# Patient Record
Sex: FEMALE | Race: BLACK OR AFRICAN AMERICAN | Employment: FULL TIME | URBAN - METROPOLITAN AREA
[De-identification: names, ages, dates, MRNs, and addresses within clinical notes are randomized per-mention and may not be internally consistent; named-entity substitution may affect disease eponyms.]

---

## 2017-02-23 ENCOUNTER — OFFICE VISIT (OUTPATIENT)
Dept: INTERNAL MEDICINE CLINIC | Age: 57
End: 2017-02-23

## 2017-02-23 VITALS
BODY MASS INDEX: 34.27 KG/M2 | HEIGHT: 63 IN | SYSTOLIC BLOOD PRESSURE: 127 MMHG | OXYGEN SATURATION: 99 % | WEIGHT: 193.4 LBS | TEMPERATURE: 96.6 F | RESPIRATION RATE: 17 BRPM | DIASTOLIC BLOOD PRESSURE: 50 MMHG | HEART RATE: 72 BPM

## 2017-02-23 DIAGNOSIS — E78.5 MILD HYPERLIPIDEMIA: ICD-10-CM

## 2017-02-23 DIAGNOSIS — R73.09 ELEVATED GLYCOSYLATED HEMOGLOBIN: Primary | ICD-10-CM

## 2017-02-23 LAB — HBA1C MFR BLD HPLC: 5.5 %

## 2017-02-23 NOTE — PATIENT INSTRUCTIONS
Prediabetes: Care Instructions  Your Care Instructions  Prediabetes is a warning sign that you are at risk for getting type 2 diabetes. It means that your blood sugar is higher than it should be. The food you eat turns into sugar, which your body uses for energy. Normally, an organ called the pancreas makes insulin, which allows the sugar in your blood to get into your body's cells. But when your body can't use insulin the right way, the sugar doesn't move into cells. It stays in your blood instead. This is called insulin resistance. The buildup of sugar in the blood causes prediabetes. The good news is that lifestyle changes may help you get your blood sugar back to normal and help you avoid or delay diabetes. Follow-up care is a key part of your treatment and safety. Be sure to make and go to all appointments, and call your doctor if you are having problems. It's also a good idea to know your test results and keep a list of the medicines you take. How can you care for yourself at home? · Watch your weight. A healthy weight helps your body use insulin properly. · Limit the amount of calories, sweets, and unhealthy fat you eat. Ask your doctor if you should see a dietitian. A registered dietitian can help you create meal plans that fit your lifestyle. · Get at least 30 minutes of exercise on most days of the week. Exercise helps control your blood sugar. It also helps you maintain a healthy weight. Walking is a good choice. You also may want to do other activities, such as running, swimming, cycling, or playing tennis or team sports. · Do not smoke. Smoking can make prediabetes worse. If you need help quitting, talk to your doctor about stop-smoking programs and medicines. These can increase your chances of quitting for good. · If your doctor prescribed medicines, take them exactly as prescribed. Call your doctor if you think you are having a problem with your medicine.  You will get more details on the specific medicines your doctor prescribes. When should you call for help? Watch closely for changes in your health, and be sure to contact your doctor if:  · You have any symptoms of diabetes. These may include:  ¨ Being thirsty more often. ¨ Urinating more. ¨ Being hungrier. ¨ Losing weight. ¨ Being very tired. ¨ Having blurry vision. · You have a wound that will not heal.  · You have an infection that will not go away. · You have problems with your blood pressure. · You want more information about diabetes and how you can keep from getting it. Where can you learn more? Go to http://micha-cydney.info/. Enter I222 in the search box to learn more about \"Prediabetes: Care Instructions. \"  Current as of: May 23, 2016  Content Version: 11.1  © 6933-7790 [employer]. Care instructions adapted under license by [employer] (which disclaims liability or warranty for this information). If you have questions about a medical condition or this instruction, always ask your healthcare professional. [address] any warranty or liability for your use of this information.

## 2017-02-24 NOTE — PROGRESS NOTES
Subjective:     Chief Complaint   Patient presents with    Other     6 mo f/u        She  is a 60y.o. year old female  With no significant past medical history is here for a 6 month follow up. Her last lab showed elevated A1c and mild hyperlipidemia otherwise normal.    She has been doing exercise at least 3 times/week. Feels energetic and better. No chest pain, soa, UTI symptoms. Pertinent items are noted in HPI. Objective:     Vitals:    02/23/17 1357   BP: 127/50   Pulse: 72   Resp: 17   Temp: 96.6 °F (35.9 °C)   TempSrc: Oral   SpO2: 99%   Weight: 193 lb 6.4 oz (87.7 kg)   Height: 5' 3\" (1.6 m)       Physical Examination: General appearance - alert, well appearing, and in no distress, oriented to person, place, and time and overweight  Mental status - alert, oriented to person, place, and time, normal mood, behavior, speech, dress, motor activity, and thought processes  Chest - clear to auscultation, no wheezes, rales or rhonchi, symmetric air entry  Heart - normal rate, regular rhythm, normal S1, S2, no murmurs, rubs, clicks or gallops.     Allergies   Allergen Reactions    Lodine [Etodolac] Swelling     Face and neck swelling      Social History     Social History    Marital status:      Spouse name: N/A    Number of children: 2    Years of education: college     Occupational History     [address] [facility]     Social History Main Topics    Smoking status: Former Smoker     Packs/day: 0.50     Quit date: 3/3/2001    Smokeless tobacco: Never Used    Alcohol use 1.2 oz/week     1 Glasses of wine, 1 Standard drinks or equivalent per week      Comment: 2-3 per month, occ    Drug use: No    Sexual activity: Yes     Partners: Male     Birth control/ protection: None     Other Topics Concern     Service No    Blood Transfusions No    Caffeine Concern No    Occupational Exposure No    Hobby Hazards No    Sleep Concern Yes     trouble staying asleep, can fall asleep easily  Stress Concern No    Weight Concern No    Special Diet No    Back Care No    Exercise No    Bike Helmet No    Seat Belt Yes    Self-Exams Yes     Social History Narrative      Family History   Problem Relation Age of Onset    Cancer Mother      breast cancer    Asthma Mother     Breast Cancer Mother     Cancer Brother 62     colon cancer    Heart Disease Father     Heart Attack Father     Hypertension Father     Psychiatric Disorder Sister     Cancer Sister 27     breast cancer    Breast Cancer Sister     Diabetes Other      cousin    Thyroid Disease Other      aunt    Rashes/Skin Problems Other      daughter      Past Surgical History:   Procedure Laterality Date    HX COLONOSCOPY  2015    HX WRIST FRACTURE [place]        Past Medical History:   Diagnosis Date    Family history of fibrocystic disease of breast     Fibrocystic breast changes 2009           Assessment/ Plan:   [facility] was seen today for other. Diagnoses and all orders for this visit:    Elevated glycosylated hemoglobin  -     AMB POC HEMOGLOBIN A1C is 5.5. Improved. -   Continue work on diet and exercise. Mild hyperlipidemia       ----     Continue work on diet and exercise. Medication risks/benefits/costs/interactions/alternatives discussed with patient. Advised patient to call back or return to office if symptoms worsen/change/persist. If patient cannot reach us or should anything more severe/urgent arise he/she should proceed directly to the nearest emergency department. Discussed expected course/resolution/complications of diagnosis in detail with patient. Patient given a written after visit summary which includes her diagnoses, current medications and vitals. Patient expressed understanding with the diagnosis and plan. Follow-up Disposition:  Return in about 6 months (around 8/23/2017) for complete physical  and fasting blood work. Zakiya Agarwal

## 2018-01-16 ENCOUNTER — OFFICE VISIT (OUTPATIENT)
Dept: INTERNAL MEDICINE CLINIC | Age: 58
End: 2018-01-16

## 2018-01-16 VITALS
OXYGEN SATURATION: 99 % | HEIGHT: 63 IN | DIASTOLIC BLOOD PRESSURE: 54 MMHG | RESPIRATION RATE: 17 BRPM | BODY MASS INDEX: 31.71 KG/M2 | HEART RATE: 67 BPM | TEMPERATURE: 96.3 F | WEIGHT: 179 LBS | SYSTOLIC BLOOD PRESSURE: 104 MMHG

## 2018-01-16 DIAGNOSIS — R79.89 LOW VITAMIN D LEVEL: ICD-10-CM

## 2018-01-16 DIAGNOSIS — Z00.00 WELL ADULT ON ROUTINE HEALTH CHECK: Primary | ICD-10-CM

## 2018-01-16 DIAGNOSIS — Z78.0 POST-MENOPAUSAL: ICD-10-CM

## 2018-01-16 DIAGNOSIS — Z11.59 ENCOUNTER FOR HEPATITIS C SCREENING TEST FOR LOW RISK PATIENT: ICD-10-CM

## 2018-01-16 RX ORDER — BISMUTH SUBSALICYLATE 262 MG
1 TABLET,CHEWABLE ORAL DAILY
COMMUNITY

## 2018-01-16 NOTE — PROGRESS NOTES
Subjective:   59 y.o. female for Well Woman Check. Her gyne care is done elsewhere by her Ob-Gyne physician. Up to date. Doing well. Has been active. Lost 14 pounds in one year. H/O low vit d in the past. She is not taking any supplement currently. Patient Active Problem List   Diagnosis Code    Mastodynia N64.4    Family history of breast cancer in mother Z80.2    Family history of breast cancer in sister Z80.2   [facility] Elevated glycosylated hemoglobin R73.09    Mild hyperlipidemia E78.5     Current Outpatient Prescriptions   Medication Sig Dispense Refill    multivitamin (ONE A DAY) tablet Take 1 Tab by mouth daily.        Allergies   Allergen Reactions    Lodine [Etodolac] Swelling     Face and neck swelling     Past Medical History:   Diagnosis Date    Family history of fibrocystic disease of breast     Fibrocystic breast changes 2009     Past Surgical History:   Procedure Laterality Date    HX COLONOSCOPY  2015    HX WRIST FRACTURE [place]       Family History   Problem Relation Age of Onset    Cancer Mother      breast cancer    Asthma Mother     Breast Cancer Mother     Cancer Brother 62     colon cancer    Heart Disease Father     Heart Attack Father     Hypertension Father     Psychiatric Disorder Sister     Cancer Sister 27     breast cancer    Breast Cancer Sister     Diabetes Other      cousin    Thyroid Disease Other      aunt    Rashes/Skin Problems Other      daughter     Social History   Substance Use Topics    Smoking status: Former Smoker     Packs/day: 0.50     Quit date: 3/3/2001    Smokeless tobacco: Never Used    Alcohol use 1.2 oz/week     1 Glasses of wine, 1 Standard drinks or equivalent per week      Comment: 2-3 per month, occ        Lab Results  Component Value Date/Time   Cholesterol, total 206 08/30/2016 09:14 AM   HDL Cholesterol 53 08/30/2016 09:14 AM   LDL, calculated 127 08/30/2016 09:14 AM   Triglyceride 130 08/30/2016 09:14 AM     Lab Results  Component Value Date/Time   ALT (SGPT) 37 08/30/2016 09:14 AM   AST (SGOT) 39 08/30/2016 09:14 AM   Alk. phosphatase 99 08/30/2016 09:14 AM   Bilirubin, total 0.3 08/30/2016 09:14 AM   Albumin 4.3 08/30/2016 09:14 AM   Protein, total 7.1 08/30/2016 09:14 AM   PLATELET 591 41/74/0253 09:14 AM       Lab Results   Component Value Date/Time    Hemoglobin A1c 5.8 08/30/2016 09:14 AM    Hemoglobin A1c (POC) 5.5 02/23/2017 02:20 PM         ROS: Feeling generally well. No TIA's or unusual headaches, no dysphagia. No prolonged cough. No dyspnea or chest pain on exertion. No abdominal pain, change in bowel habits, black or bloody stools. No urinary tract symptoms. No new or unusual musculoskeletal symptoms. Specific concerns today: none. .    Objective: The patient appears well, alert, oriented x 3, in no distress. Visit Vitals    /54 (BP 1 Location: Left arm, BP Patient Position: Sitting)    Pulse 67    Temp 96.3 °F (35.7 °C) (Temporal)    Resp 17    Ht 5' 3\" (1.6 m)    Wt 179 lb (81.2 kg)    SpO2 99%    BMI 31.71 kg/m2     ENT normal.  Neck supple. No adenopathy or thyromegaly. KARINA. Lungs are clear, good air entry, no wheezes, rhonchi or rales. S1 and S2 normal, no murmurs, regular rate and rhythm. Abdomen soft without tenderness, guarding, mass or organomegaly. Extremities show no edema, normal peripheral pulses. Neurological is normal, no focal findings. Skin: normal.   Breast and Pelvic exams are deferred. Assessment/Plan:   Well Woman  lose weight, increase physical activity, follow low fat diet, continue present plan, routine labs ordered, call if any problems    ICD-10-CM ICD-9-CM    1. Well adult on routine health check Z00.00 V70.0 CARLOS MAMMO BI SCREENING INCL CAD      CBC WITH AUTOMATED DIFF      LIPID PANEL      METABOLIC PANEL, COMPREHENSIVE      HEMOGLOBIN A1C WITH EAG      TSH AND FREE T4   2.  Low vitamin D level E55.9 268.9 VITAMIN D, 25 HYDROXY   3. Post-menopausal Z78.0 V49.81 DEXA BONE DENSITY STUDY AXIAL   4. Encounter for hepatitis C screening test for low risk patient Z11.59 V73.89 HEPATITIS C AB     Diagnoses and all orders for this visit:    1. Well adult on routine health check  -     CARLOS MAMMO BI SCREENING INCL CAD; Future  -     CBC WITH AUTOMATED DIFF  -     LIPID PANEL  -     METABOLIC PANEL, COMPREHENSIVE  -     HEMOGLOBIN A1C WITH EAG  -     TSH AND FREE T4    2. Low vitamin D level  -     VITAMIN D, 25 HYDROXY    3. Post-menopausal  -     DEXA BONE DENSITY STUDY AXIAL; Future  -     Advised to start taking calcium and vit D supplement. Verified dose. 4. Encounter for hepatitis C screening test for low risk patient  -     HEPATITIS C AB      Follow-up Disposition:  Return if symptoms worsen or fail to improve.   reviewed diet, exercise and weight control

## 2018-01-16 NOTE — PATIENT INSTRUCTIONS
Well Visit, Women 48 to 72: Care Instructions  Your Care Instructions    Physical exams can help you stay healthy. Your doctor has checked your overall health and may have suggested ways to take good care of yourself. He or she also may have recommended tests. At home, you can help prevent illness with healthy eating, regular exercise, and other steps. Follow-up care is a key part of your treatment and safety. Be sure to make and go to all appointments, and call your doctor if you are having problems. It's also a good idea to know your test results and keep a list of the medicines you take. How can you care for yourself at home? · Reach and stay at a healthy weight. This will lower your risk for many problems, such as obesity, diabetes, heart disease, and high blood pressure. · Get at least 30 minutes of exercise on most days of the week. Walking is a good choice. You also may want to do other activities, such as running, swimming, cycling, or playing tennis or team sports. · Do not smoke. Smoking can make health problems worse. If you need help quitting, talk to your doctor about stop-smoking programs and medicines. These can increase your chances of quitting for good. · Protect your skin from too much sun. When you're outdoors from 10 a.m. to 4 p.m., stay in the shade or cover up with clothing and a hat with a wide brim. Wear sunglasses that block UV rays. Even when it's cloudy, put broad-spectrum sunscreen (SPF 30 or higher) on any exposed skin. · See a dentist one or two times a year for checkups and to have your teeth cleaned. · Wear a seat belt in the car. · Limit alcohol to 1 drink a day. Too much alcohol can cause health problems. Follow your doctor's advice about when to have certain tests. These tests can spot problems early. · Cholesterol.  Your doctor will tell you how often to have this done based on your age, family history, or other things that can increase your risk for heart attack and stroke. · Blood pressure. Have your blood pressure checked during a routine doctor visit. Your doctor will tell you how often to check your blood pressure based on your age, your blood pressure results, and other factors. · Mammogram. Ask your doctor how often you should have a mammogram, which is an X-ray of your breasts. A mammogram can spot breast cancer before it can be felt and when it is easiest to treat. · Pap test and pelvic exam. Ask your doctor how often you should have a Pap test. You may not need to have a Pap test as often as you used to. · Vision. Have your eyes checked every year or two or as often as your doctor suggests. Some experts recommend that you have yearly exams for glaucoma and other age-related eye problems starting at age 48. · Hearing. Tell your doctor if you notice any change in your hearing. You can have tests to find out how well you hear. · Diabetes. Ask your doctor whether you should have tests for diabetes. · Colon cancer. You should begin tests for colon cancer at age 48. You may have one of several tests. Your doctor will tell you how often to have tests based on your age and risk. Risks include whether you already had a precancerous polyp removed from your colon or whether your parents, sisters and brothers, or children have had colon cancer. · Thyroid disease. Talk to your doctor about whether to have your thyroid checked as part of a regular physical exam. Women have an increased chance of a thyroid problem. · Osteoporosis. You should begin tests for bone density at age 72. If you are younger than 72, ask your doctor whether you have factors that may increase your risk for this disease. You may want to have this test before age 72. · Heart attack and stroke risk. At least every 4 to 6 years, you should have your risk for heart attack and stroke assessed.  Your doctor uses factors such as your age, blood pressure, cholesterol, and whether you smoke or have diabetes to show what your risk for a heart attack or stroke is over the next 10 years. When should you call for help? Watch closely for changes in your health, and be sure to contact your doctor if you have any problems or symptoms that concern you. Where can you learn more? Go to http://micha-cydney.info/. Enter U245 in the search box to learn more about \"Well Visit, Women 50 to 72: Care Instructions. \"  Current as of: May 12, 2017  Content Version: 11.4  © 7927-8658 [employer]. Care instructions adapted under license by [employer] (which disclaims liability or warranty for this information). If you have questions about a medical condition or this instruction, always ask your healthcare professional. [address] any warranty or liability for your use of this information.

## 2018-01-16 NOTE — MR AVS SNAPSHOT
[facility] [address] 
[phone] Patient: Alfredo Vo MRN: [id number] Visit Information Date & Time Provider Department Dept. Phone Encounter #  
 1/16/2018  7:45 AM Jared Robledo MD [facility] [phone] [id number] Follow-up Instructions Return if symptoms worsen or fail to improve. Upcoming Health Maintenance Date Due Hepatitis C Screening 1960 DTaP/Tdap/Td series (1 - Tdap) 6/29/1979 BREAST CANCER SCRN MAMMOGRAM 12/9/2017 PAP AKA CERVICAL CYTOLOGY 10/15/2018 COLONOSCOPY 7/17/2020 Allergies as of 1/16/2018  Review Complete On: 1/16/2018 By: Brian Prieto MD  
  
 Severity Noted Reaction Type Reactions Lodine [Etodolac] High 01/18/2016    Swelling Face and neck swelling Current Immunizations  Never Reviewed Name Date Influenza Vaccine (Quad) PF 10/17/2016 Not reviewed this visit You Were Diagnosed With   
  
 Codes Comments Well adult on routine health check    -  Primary ICD-10-CM: Z00.00 ICD-9-CM: V70.0 Low vitamin D level     ICD-10-CM: E55.9 ICD-9-CM: 268.9 Post-menopausal     ICD-10-CM: Z78.0 ICD-9-CM: V49.81 Encounter for hepatitis C screening test for low risk patient     ICD-10-CM: Z11.59 
ICD-9-CM: V73.89 Vitals BP Pulse Temp Resp Height(growth percentile) 104/54 (BP 1 Location: Left arm, BP Patient Position: Sitting) 67 96.3 °F (35.7 °C) (Temporal) 17 5' 3\" (1.6 m) Weight(growth percentile) SpO2 BMI OB Status Smoking Status 179 lb (81.2 kg) 99% 31.71 kg/m2 Postmenopausal Former Smoker Vitals History BMI and BSA Data Body Mass Index Body Surface Area 31.71 kg/m 2 1.9 m 2 Preferred Pharmacy Pharmacy Name Phone [id number] [place] [facility] [address] AT [address] [place] [address] [phone] Your Updated Medication List  
  
   
 This list is accurate as of: 1/16/18  8:15 AM.  Always use your most recent med list.  
  
  
  
  
 multivitamin tablet Commonly known as:  ONE A DAY Take 1 Tab by mouth daily. We Performed the Following CBC WITH AUTOMATED DIFF [01159 CPT(R)] HEMOGLOBIN A1C WITH EAG [58018 CPT(R)] HEPATITIS C AB [06353 CPT(R)] LIPID PANEL [89629 CPT(R)] METABOLIC PANEL, COMPREHENSIVE [41667 CPT(R)] TSH AND FREE T4 [67089 CPT(R)] VITAMIN D, 25 HYDROXY [id number] CPT(R)] Follow-up Instructions Return if symptoms worsen or fail to improve. To-Do List   
 01/16/2018 Imaging:  DEXA BONE DENSITY STUDY AXIAL   
  
 01/16/2018 Imaging:  CARLOS MAMMO BI SCREENING INCL CAD Patient Instructions Well Visit, Women 48 to 72: Care Instructions Your Care Instructions Physical exams can help you stay healthy. Your doctor has checked your overall health and may have suggested ways to take good care of yourself. He or she also may have recommended tests. At home, you can help prevent illness with healthy eating, regular exercise, and other steps. Follow-up care is a key part of your treatment and safety. Be sure to make and go to all appointments, and call your doctor if you are having problems. It's also a good idea to know your test results and keep a list of the medicines you take. How can you care for yourself at home? · Reach and stay at a healthy weight. This will lower your risk for many problems, such as obesity, diabetes, heart disease, and high blood pressure. · Get at least 30 minutes of exercise on most days of the week. Walking is a good choice. You also may want to do other activities, such as running, swimming, cycling, or playing tennis or team sports. · Do not smoke. Smoking can make health problems worse. If you need help quitting, talk to your doctor about stop-smoking programs and medicines. These can increase your chances of quitting for good. · Protect your skin from too much sun. When you're outdoors from 10 a.m. to 4 p.m., stay in the shade or cover up with clothing and a hat with a wide brim. Wear sunglasses that block UV rays. Even when it's cloudy, put broad-spectrum sunscreen (SPF 30 or higher) on any exposed skin. · See a dentist one or two times a year for checkups and to have your teeth cleaned. · Wear a seat belt in the car. · Limit alcohol to 1 drink a day. Too much alcohol can cause health problems. Follow your doctor's advice about when to have certain tests. These tests can spot problems early. · Cholesterol. Your doctor will tell you how often to have this done based on your age, family history, or other things that can increase your risk for heart attack and stroke. · Blood pressure. Have your blood pressure checked during a routine doctor visit. Your doctor will tell you how often to check your blood pressure based on your age, your blood pressure results, and other factors. · Mammogram. Ask your doctor how often you should have a mammogram, which is an X-ray of your breasts. A mammogram can spot breast cancer before it can be felt and when it is easiest to treat. · Pap test and pelvic exam. Ask your doctor how often you should have a Pap test. You may not need to have a Pap test as often as you used to. · Vision. Have your eyes checked every year or two or as often as your doctor suggests. Some experts recommend that you have yearly exams for glaucoma and other age-related eye problems starting at age 48. · Hearing. Tell your doctor if you notice any change in your hearing. You can have tests to find out how well you hear. · Diabetes. Ask your doctor whether you should have tests for diabetes. · Colon cancer. You should begin tests for colon cancer at age 48. You may have one of several tests.  Your doctor will tell you how often to have tests based on your age and risk. Risks include whether you already had a precancerous polyp removed from your colon or whether your parents, sisters and brothers, or children have had colon cancer. · Thyroid disease. Talk to your doctor about whether to have your thyroid checked as part of a regular physical exam. Women have an increased chance of a thyroid problem. · Osteoporosis. You should begin tests for bone density at age 72. If you are younger than 72, ask your doctor whether you have factors that may increase your risk for this disease. You may want to have this test before age 72. · Heart attack and stroke risk. At least every 4 to 6 years, you should have your risk for heart attack and stroke assessed. Your doctor uses factors such as your age, blood pressure, cholesterol, and whether you smoke or have diabetes to show what your risk for a heart attack or stroke is over the next 10 years. When should you call for help? Watch closely for changes in your health, and be sure to contact your doctor if you have any problems or symptoms that concern you. Where can you learn more? Go to http://micha-cydney.info/. Enter W516 in the search box to learn more about \"Well Visit, Women 50 to 72: Care Instructions. \" Current as of: May 12, 2017 Content Version: 11.4 © 7790-8613 Healthwise, Incorporated. Care instructions adapted under license by [employer] (which disclaims liability or warranty for this information). If you have questions about a medical condition or this instruction, always ask your healthcare professional. [place] [address] any warranty or liability for your use of this information. Introducing [facility]! Dieudonne Macias introduces [employer] patient portal. Now you can access parts of your medical record, email your doctor's office, and request medication refills online.    
 
1. In your internet browser, go to [email or web address] 2. Click on the First Time User? Click Here link in the Sign In box. You will see the New Member Sign Up page. 3. Enter your [employer] Access Code exactly as it appears below. You will not need to use this code after youve completed the sign-up process. If you do not sign up before the expiration date, you must request a new code. · [employer] Access Code: [email or web address] Expires: 4/16/2018  7:59 AM 
 
4. Enter the last four digits of your Social Security Number (xxxx) and Date of Birth (mm/dd/yyyy) as indicated and click Submit. You will be taken to the next sign-up page. 5. Create a [employer] ID. This will be your [employer] login ID and cannot be changed, so think of one that is secure and easy to remember. 6. Create a [employer] password. You can change your password at any time. 7. Enter your Password Reset Question and Answer. This can be used at a later time if you forget your password. 8. Enter your e-mail address. You will receive e-mail notification when new information is available in [address] 9. Click Sign Up. You can now view and download portions of your medical record. 10. Click the Download Summary menu link to download a portable copy of your medical information. If you have questions, please visit the Frequently Asked Questions section of the [employer] website. Remember, [employer] is NOT to be used for urgent needs. For medical emergencies, dial 911. Now available from your iPhone and Android! Please provide this summary of care documentation to your next provider. Your primary care clinician is listed as Jared Mcwilliams. If you have any questions after today's visit, please call [phone].

## 2018-01-17 ENCOUNTER — TELEPHONE (OUTPATIENT)
Dept: INTERNAL MEDICINE CLINIC | Age: 58
End: 2018-01-17

## 2018-01-17 LAB
25(OH)D3+25(OH)D2 SERPL-MCNC: 28.6 NG/ML (ref 30–100)
ALBUMIN SERPL-MCNC: 4.1 G/DL (ref 3.5–5.5)
ALBUMIN/GLOB SERPL: 1.4 {RATIO} (ref 1.2–2.2)
ALP SERPL-CCNC: 75 IU/L (ref 39–117)
ALT SERPL-CCNC: 25 IU/L (ref 0–32)
AST SERPL-CCNC: 30 IU/L (ref 0–40)
BASOPHILS # BLD AUTO: 0 X10E3/UL (ref 0–0.2)
BASOPHILS NFR BLD AUTO: 0 %
BILIRUB SERPL-MCNC: 0.3 MG/DL (ref 0–1.2)
BUN SERPL-MCNC: 9 MG/DL (ref 6–24)
BUN/CREAT SERPL: 13 (ref 9–23)
CALCIUM SERPL-MCNC: 9.6 MG/DL (ref 8.7–10.2)
CHLORIDE SERPL-SCNC: 104 MMOL/L (ref 96–106)
CHOLEST SERPL-MCNC: 228 MG/DL (ref 100–199)
CO2 SERPL-SCNC: 23 MMOL/L (ref 18–29)
CREAT SERPL-MCNC: 0.69 MG/DL (ref 0.57–1)
EOSINOPHIL # BLD AUTO: 0.2 X10E3/UL (ref 0–0.4)
EOSINOPHIL NFR BLD AUTO: 3 %
ERYTHROCYTE [DISTWIDTH] IN BLOOD BY AUTOMATED COUNT: 14.7 % (ref 12.3–15.4)
EST. AVERAGE GLUCOSE BLD GHB EST-MCNC: 117 MG/DL
GLOBULIN SER CALC-MCNC: 3 G/DL (ref 1.5–4.5)
GLUCOSE SERPL-MCNC: 109 MG/DL (ref 65–99)
HBA1C MFR BLD: 5.7 % (ref 4.8–5.6)
HCT VFR BLD AUTO: 38.4 % (ref 34–46.6)
HCV AB S/CO SERPL IA: <0.1 S/CO RATIO (ref 0–0.9)
HDLC SERPL-MCNC: 56 MG/DL
HGB BLD-MCNC: 13.1 G/DL (ref 11.1–15.9)
IMM GRANULOCYTES # BLD: 0 X10E3/UL (ref 0–0.1)
IMM GRANULOCYTES NFR BLD: 0 %
INTERPRETATION, 910389: NORMAL
LDLC SERPL CALC-MCNC: 156 MG/DL (ref 0–99)
LYMPHOCYTES # BLD AUTO: 2.2 X10E3/UL (ref 0.7–3.1)
LYMPHOCYTES NFR BLD AUTO: 34 %
MCH RBC QN AUTO: 31.5 PG (ref 26.6–33)
MCHC RBC AUTO-ENTMCNC: 34.1 G/DL (ref 31.5–35.7)
MCV RBC AUTO: 92 FL (ref 79–97)
MONOCYTES # BLD AUTO: 0.3 X10E3/UL (ref 0.1–0.9)
MONOCYTES NFR BLD AUTO: 5 %
NEUTROPHILS # BLD AUTO: 3.7 X10E3/UL (ref 1.4–7)
NEUTROPHILS NFR BLD AUTO: 58 %
PLATELET # BLD AUTO: 238 X10E3/UL (ref 150–379)
POTASSIUM SERPL-SCNC: 4.2 MMOL/L (ref 3.5–5.2)
PROT SERPL-MCNC: 7.1 G/DL (ref 6–8.5)
RBC # BLD AUTO: 4.16 X10E6/UL (ref 3.77–5.28)
SODIUM SERPL-SCNC: 145 MMOL/L (ref 134–144)
T4 FREE SERPL-MCNC: 1.07 NG/DL (ref 0.82–1.77)
TRIGL SERPL-MCNC: 78 MG/DL (ref 0–149)
TSH SERPL DL<=0.005 MIU/L-ACNC: 1.35 UIU/ML (ref 0.45–4.5)
VLDLC SERPL CALC-MCNC: 16 MG/DL (ref 5–40)
WBC # BLD AUTO: 6.4 X10E3/UL (ref 3.4–10.8)

## 2018-01-17 NOTE — PROGRESS NOTES
Please call patient:     1. CBC, kidney, liver, thyroid level is within normal range. 2. Cholesterol level is high and higher that last time. LDL level jumped from 127 to 156. No need to start any medication at this pont considering risk factors. Continue watching your diet, eat healthy, less ann and fatty food, more baked or grilled or broiled food. Exercise 150 minutes/week will be helpful as well. Will recheck level in 6 months. 3. Vit d level is mildly low. Start OTC 2,000 IU vit D daily. Increase vit D rich food. Exposed to sun total of 60 minutes /week will help. 4. Prediabetes: Average blood sugar( Hg A1c ) is 5.7,  in the range of prediabetic level,  improved from last time. . Prediabetes is a warning sign that you are at risk for getting type 2 diabetes. It means that your blood sugar is higher than it should be. Most people who get type 2 diabetes have prediabetes first. The good news is that lifestyle changes may help you get your blood sugar back to normal and avoid or delay diabetes. Will recheck this level in 6 months. 5. Hep C is negative.

## 2018-01-17 NOTE — TELEPHONE ENCOUNTER
----- Message from Cecy Pope MD sent at 1/17/2018  2:12 PM EST -----  Please call patient:     1. CBC, kidney, liver, thyroid level is within normal range. 2. Cholesterol level is high and higher that last time. LDL level jumped from 127 to 156. No need to start any medication at this pont considering risk factors. Continue watching your diet, eat healthy, less ann and fatty food, more baked or grilled or broiled food. Exercise 150 minutes/week will be helpful as well. Will recheck level in 6 months. 3. Vit d level is mildly low. Start OTC 2,000 IU vit D daily. Increase vit D rich food. Exposed to sun total of 60 minutes /week will help. 4. Prediabetes: Average blood sugar( Hg A1c ) is 5.7,  in the range of prediabetic level,  improved from last time. . Prediabetes is a warning sign that you are at risk for getting type 2 diabetes. It means that your blood sugar is higher than it should be. Most people who get type 2 diabetes have prediabetes first. The good news is that lifestyle changes may help you get your blood sugar back to normal and avoid or delay diabetes. Will recheck this level in 6 months. 5. Hep C is negative.

## 2018-01-24 ENCOUNTER — CLINICAL SUPPORT (OUTPATIENT)
Dept: INTERNAL MEDICINE CLINIC | Age: 58
End: 2018-01-24

## 2018-01-24 VITALS
DIASTOLIC BLOOD PRESSURE: 58 MMHG | RESPIRATION RATE: 18 BRPM | OXYGEN SATURATION: 98 % | BODY MASS INDEX: 31.71 KG/M2 | SYSTOLIC BLOOD PRESSURE: 106 MMHG | HEART RATE: 74 BPM | HEIGHT: 63 IN | WEIGHT: 179 LBS | TEMPERATURE: 96.3 F

## 2018-01-24 DIAGNOSIS — Z23 ENCOUNTER FOR IMMUNIZATION: Primary | ICD-10-CM

## 2018-01-24 NOTE — PROGRESS NOTES
Chief Complaint   Patient presents with    Immunization/Injection     flu shot     Flu shot administered 1/24/2018 by Violet Higginbotham LPN with patient's consent. Patient tolerated procedure well in left deltoid. VIS given. No reactions noted.

## 2018-01-31 ENCOUNTER — HOSPITAL ENCOUNTER (OUTPATIENT)
Dept: BONE DENSITY | Age: 58
Discharge: HOME | End: 2018-01-31
Attending: FAMILY MEDICINE
Payer: COMMERCIAL

## 2018-01-31 ENCOUNTER — HOSPITAL ENCOUNTER (OUTPATIENT)
Dept: MAMMOGRAPHY | Age: 58
Discharge: HOME | End: 2018-01-31
Attending: FAMILY MEDICINE
Payer: COMMERCIAL

## 2018-01-31 DIAGNOSIS — Z00.00 WELL ADULT ON ROUTINE HEALTH CHECK: ICD-10-CM

## 2018-01-31 DIAGNOSIS — Z78.0 POST-MENOPAUSAL: ICD-10-CM

## 2018-01-31 PROCEDURE — 77080 DXA BONE DENSITY AXIAL: CPT

## 2018-01-31 PROCEDURE — 77067 SCR MAMMO BI INCL CAD: CPT

## 2018-01-31 NOTE — LETTER
2/1/2018 1:38 PM 
 
Ms. Supa Rivera [place] [phone] Dear Supa Bain: 
 
Please find your most recent results below. Resulted Orders DEXA BONE DENSITY STUDY AXIAL Narrative Bone Mineral Density Indication:  post menopausal 
Age: 59 Sex: Female. Menopause status: Postmenopausal. 
Hormone replacement therapy: No  
 
Number of falls in the past year:   None. Risk factors for osteoporosis:  None. Current medication for osteoporosis: None. Comparison: None. Technique: Imaging was performed on the [employer]. World Health Organization 
meta-analysis fracture risk calculator (FRAX) analysis was performed for 10 year 
fracture risk probability assessment Excluded sites: None Findings: 
  
 
Femoral Neck:  Left Bone mineral density (gm/cm2):? 0.932 
% of peak bone mass: 90 
% for age matched controls:? 102 T-score: -0.8 Z-score: 0.1 Total Hip: Left Bone mineral density (gm/cm2):  0.941 
% of peak bone mass:   93 
% for age matched controls:  100 T-score:   -0.5 Z-score:  0.0 Lumbar Spine:  L1-L4 Bone mineral density (gm/cm2):  1.035 
% of peak bone mass:  87  
% for age matched controls:  80 
T-score:  -1.3 Z-score:  -0.7 Impression Impression: This patient is osteopenic using the World Health Organization criteria 10 year probability of major osteoporotic fracture:  6% 
10 year probability of hip fracture:  0.4% Recommendations: 
Therapy recommendations need to be tailored to each individual patient. Using 
the [address]) FRAX absolute fracture algorithm, the 
[address] [place] [address] recommends beginning pharmacological therapy in 
postmenopausal women and men over the age of 48 with a 8 year probability of a 
hip fracture of >3% OR with the 10 year probability of a major osteoporotic 
fracture of >20%. Please reconsider testing based on risk factors. Currently, Medicare will only 
reimburse for a central DXA examination every two years, unless the patient is 
on chronic glucocorticoid therapy. Note: Please note that reliable, valid comparisons cannot be made between 
studies which have been performed on machines from different manufacturers. If 
clinically warranted, a follow up study performed at this site, on the same 
unit, would allow the most sensitive assessment of change in bone mineral 
density. RECOMMENDATIONS: 
 
Recent bone density scan revealed osteopenia( also know as low bone mass ). Osteopenia is a medical term for bone density that is lower than normal but not as low as gets with osteoporosis. A person with osteopenia does not yet have osteoporosis but have increase chance of osteoporosis and  has greater risk of break a bone. Continue Vit d supplement OTC 2,000 IU vit D daily and start taking calcium 1200 mg daily. F/U DEXA scan in two years. Please call me if you have any questions: [phone] Sincerely, LIANNA DEXA 1

## 2018-02-01 NOTE — PROGRESS NOTES
Please call her:     Recent bone density scan revealed osteopenia( also know as low bone mass ). Osteopenia is a medical term for bone density that is lower than normal but not as low as gets with osteoporosis. A person with osteopenia does not yet have osteoporosis but have increase chance of osteoporosis and  has greater risk of break a bone. Continue Vit d supplement OTC 2,000 IU vit D daily and start taking calcium 1200 mg daily. F/U DEXA scan in two years.

## 2019-05-08 ENCOUNTER — HOSPITAL ENCOUNTER (OUTPATIENT)
Dept: MAMMOGRAPHY | Age: 59
Discharge: HOME | End: 2019-05-08
Attending: FAMILY MEDICINE
Payer: COMMERCIAL

## 2019-05-08 DIAGNOSIS — Z12.39 BREAST SCREENING, UNSPECIFIED: ICD-10-CM

## 2019-05-08 PROCEDURE — 77067 SCR MAMMO BI INCL CAD: CPT

## 2020-01-15 ENCOUNTER — OFFICE VISIT (OUTPATIENT)
Dept: PRIMARY CARE CLINIC | Age: 60
End: 2020-01-15

## 2020-01-15 VITALS
BODY MASS INDEX: 29.06 KG/M2 | RESPIRATION RATE: 17 BRPM | HEART RATE: 67 BPM | WEIGHT: 164 LBS | OXYGEN SATURATION: 98 % | SYSTOLIC BLOOD PRESSURE: 108 MMHG | TEMPERATURE: 97.9 F | DIASTOLIC BLOOD PRESSURE: 68 MMHG | HEIGHT: 63 IN

## 2020-01-15 DIAGNOSIS — Z00.00 WELL ADULT ON ROUTINE HEALTH CHECK: Primary | ICD-10-CM

## 2020-01-15 DIAGNOSIS — L65.9 HAIR LOSS: ICD-10-CM

## 2020-01-15 DIAGNOSIS — E55.9 VITAMIN D DEFICIENCY: ICD-10-CM

## 2020-01-15 RX ORDER — ACETAMINOPHEN 500 MG
TABLET ORAL
COMMUNITY

## 2020-01-15 RX ORDER — LANOLIN ALCOHOL/MO/W.PET/CERES
CREAM (GRAM) TOPICAL
COMMUNITY

## 2020-01-15 RX ORDER — GLUCOSAMINE SULFATE 1500 MG
1000 POWDER IN PACKET (EA) ORAL DAILY
COMMUNITY

## 2020-01-15 NOTE — PATIENT INSTRUCTIONS
Well Visit, Women 48 to 72: Care Instructions Your Care Instructions Physical exams can help you stay healthy. Your doctor has checked your overall health and may have suggested ways to take good care of yourself. He or she also may have recommended tests. At home, you can help prevent illness with healthy eating, regular exercise, and other steps. Follow-up care is a key part of your treatment and safety. Be sure to make and go to all appointments, and call your doctor if you are having problems. It's also a good idea to know your test results and keep a list of the medicines you take. How can you care for yourself at home? · Reach and stay at a healthy weight. This will lower your risk for many problems, such as obesity, diabetes, heart disease, and high blood pressure. · Get at least 30 minutes of exercise on most days of the week. Walking is a good choice. You also may want to do other activities, such as running, swimming, cycling, or playing tennis or team sports. · Do not smoke. Smoking can make health problems worse. If you need help quitting, talk to your doctor about stop-smoking programs and medicines. These can increase your chances of quitting for good. · Protect your skin from too much sun. When you're outdoors from 10 a.m. to 4 p.m., stay in the shade or cover up with clothing and a hat with a wide brim. Wear sunglasses that block UV rays. Even when it's cloudy, put broad-spectrum sunscreen (SPF 30 or higher) on any exposed skin. · See a dentist one or two times a year for checkups and to have your teeth cleaned. · Wear a seat belt in the car. Follow your doctor's advice about when to have certain tests. These tests can spot problems early. · Cholesterol. Your doctor will tell you how often to have this done based on your age, family history, or other things that can increase your risk for heart attack and stroke. · Blood pressure. Have your blood pressure checked during a routine doctor visit. Your doctor will tell you how often to check your blood pressure based on your age, your blood pressure results, and other factors. · Mammogram. Ask your doctor how often you should have a mammogram, which is an X-ray of your breasts. A mammogram can spot breast cancer before it can be felt and when it is easiest to treat. · Pap test and pelvic exam. Ask your doctor how often you should have a Pap test. You may not need to have a Pap test as often as you used to. · Vision. Have your eyes checked every year or two or as often as your doctor suggests. Some experts recommend that you have yearly exams for glaucoma and other age-related eye problems starting at age 48. · Hearing. Tell your doctor if you notice any change in your hearing. You can have tests to find out how well you hear. · Diabetes. Ask your doctor whether you should have tests for diabetes. · Colorectal cancer. Your risk for colorectal cancer gets higher as you get older. Some experts say that adults should start regular screening at age 48 and stop at age 76. Others say to start before age 48 or continue after age 76. Talk with your doctor about your risk and when to start and stop screening. · Thyroid disease. Talk to your doctor about whether to have your thyroid checked as part of a regular physical exam. Women have an increased chance of a thyroid problem. · Osteoporosis. You should begin tests for bone density at age 72. If you are younger than 72, ask your doctor whether you have factors that may increase your risk for this disease. You may want to have this test before age 72. · Heart attack and stroke risk. At least every 4 to 6 years, you should have your risk for heart attack and stroke assessed.  Your doctor uses factors such as your age, blood pressure, cholesterol, and whether you smoke or have diabetes to show what your risk for a heart attack or stroke is over the next 10 years. When should you call for help? Watch closely for changes in your health, and be sure to contact your doctor if you have any problems or symptoms that concern you. Where can you learn more? Go to http://micha-cydney.info/. Enter W904 in the search box to learn more about \"Well Visit, Women 50 to 72: Care Instructions. \" Current as of: December 13, 2018 Content Version: 12.2 © 9242-4804 Healthwise, Incorporated. Care instructions adapted under license by [employer] (which disclaims liability or warranty for this information). If you have questions about a medical condition or this instruction, always ask your healthcare professional. [address] any warranty or liability for your use of this information.

## 2020-01-15 NOTE — PROGRESS NOTES
Subjective:   62 y.o. female for Well Woman Check. She is here after 2 years. Her gyne and breast care is done elsewhere by her Ob-Gyne physician. She is due for her colonoscopy this year. She will make appointment. She exercises regularly, eats healthy. She notice her her hair is thinning and shedding more lately. Patient Active Problem List   Diagnosis Code    Mastodynia N64.4    Family history of breast cancer in mother Z80.2    Family history of breast cancer in sister Z80.2   [place] Elevated glycosylated hemoglobin R73.09    Mild hyperlipidemia E78.5     Current Outpatient Medications   Medication Sig Dispense Refill    APPLE CIDER VINEGAR PO Take 450 mg by mouth daily.  KRILL OIL PO Take 350 mg by mouth daily.  biotin/keratin (BIOTIN PLUS KERATIN PO) Take 1 Tab by mouth daily.  cholecalciferol (VITAMIN D3) 25 mcg (1,000 unit) cap Take 1,000 Units by mouth daily.  OTHER 2 Tabs daily. Liver focus formula      melatonin 3 mg tablet Take  by mouth nightly as needed.  lidocaine 4 % patch 1 Patch by TransDERmal route as needed.  acetaminophen (TYLENOL) 500 mg tablet Take  by mouth every six (6) hours as needed for Pain.  multivitamin (ONE A DAY) tablet Take 1 Tab by mouth daily.        Allergies   Allergen Reactions    Lodine [Etodolac] Swelling     Face and neck swelling     Past Medical History:   Diagnosis Date    Family history of fibrocystic disease of breast     Fibrocystic breast changes 2009     Past Surgical History:   Procedure Laterality Date    HX COLONOSCOPY  2015    HX WRIST FRACTURE TX       Family History   Problem Relation Age of Onset    Cancer Mother         breast cancer    Asthma Mother     Breast Cancer Mother 76    Cancer Brother 62        colon cancer    Heart Disease Father     Heart Attack Father     Hypertension Father     Psychiatric Disorder Sister     Cancer Sister 27        breast cancer    Breast Cancer Sister 45    Diabetes Other         cousin    Thyroid Disease Other         aunt    Rashes/Skin Problems Other         daughter     Social History     Tobacco Use    Smoking status: Former Smoker     Packs/day: 0.50     Last attempt to quit: 3/3/2001     Years since quittin.8    Smokeless tobacco: Never Used   Substance Use Topics    Alcohol use: Yes     Alcohol/week: 2.0 standard drinks     Types: 1 Glasses of wine, 1 Standard drinks or equivalent per week     Comment: 2-3 per month, occ        Lab Results   Component Value Date/Time    WBC 6.4 2018 08:16 AM    HGB 13.1 2018 08:16 AM    HCT 38.4 2018 08:16 AM    PLATELET 748  08:16 AM    MCV 92 2018 08:16 AM     Lab Results   Component Value Date/Time    Cholesterol, total 228 (H) 2018 08:16 AM    HDL Cholesterol 56 2018 08:16 AM    LDL, calculated 156 (H) 2018 08:16 AM    Triglyceride 78 2018 08:16 AM     Lab Results   Component Value Date/Time    ALT (SGPT) 25 2018 08:16 AM    AST (SGOT) 30 2018 08:16 AM    Alk. phosphatase 75 2018 08:16 AM    Bilirubin, total 0.3 2018 08:16 AM    Albumin 4.1 2018 08:16 AM    Protein, total 7.1 2018 08:16 AM    PLATELET 546  08:16 AM     Lab Results   Component Value Date/Time    GFR est non-AA 96 2018 08:16 AM    GFR est  2018 08:16 AM    Creatinine 0.69 2018 08:16 AM    BUN 9 2018 08:16 AM    Sodium 145 (H) 2018 08:16 AM    Potassium 4.2 2018 08:16 AM    Chloride 104 2018 08:16 AM    CO2 23 2018 08:16 AM     Lab Results   Component Value Date/Time    TSH 1.350 2018 08:16 AM    T4, Free 1.07 2018 08:16 AM      Lab Results   Component Value Date/Time    Hemoglobin A1c 5.7 (H) 2018 08:16 AM    Hemoglobin A1c (POC) 5.5 2017 02:20 PM         ROS: Feeling generally well. No TIA's or unusual headaches, no dysphagia. No prolonged cough.  No dyspnea or chest pain on exertion. No abdominal pain, change in bowel habits, black or bloody stools. No urinary tract symptoms. No new or unusual musculoskeletal symptoms. Specific concerns today: none. Objective: The patient appears well, alert, oriented x 3, in no distress. Visit Vitals  /68 (BP 1 Location: Right arm, BP Patient Position: Sitting)   Pulse 67   Temp 97.9 °F (36.6 °C) (Oral)   Resp 17   Ht 5' 3\" (1.6 m)   Wt 164 lb (74.4 kg)   SpO2 98%   BMI 29.05 kg/m²     ENT normal.  Neck supple. No adenopathy or thyromegaly. KARINA. Lungs are clear, good air entry, no wheezes, rhonchi or rales. S1 and S2 normal, no murmurs, regular rate and rhythm. Abdomen soft without tenderness, guarding, mass or organomegaly. Extremities show no edema, normal peripheral pulses. Neurological is normal, no focal findings. Breast and Pelvic exams are deferred. Assessment/Plan:   Well Woman  lose weight, increase physical activity, follow low fat diet, follow low salt diet, continue present plan, routine labs ordered, call if any problems    ICD-10-CM ICD-9-CM    1. Well adult on routine health check Z00.00 V70.0 LIPID PANEL      METABOLIC PANEL, COMPREHENSIVE      HEMOGLOBIN A1C WITH EAG      THYROID CASCADE PROFILE      CBC WITH AUTOMATED DIFF   2. Vitamin D deficiency E55.9 268.9 VITAMIN D, 25 HYDROXY   3. Hair loss L65.9 704.00 THYROID CASCADE PROFILE     Diagnoses and all orders for this visit:    1. Well adult on routine health check  -     LIPID PANEL  -     METABOLIC PANEL, COMPREHENSIVE  -     HEMOGLOBIN A1C WITH EAG  -     THYROID CASCADE PROFILE  -     CBC WITH AUTOMATED DIFF    2. Vitamin D deficiency  -   Currently taking Vit D 1,000 iu    VITAMIN D, 25 HYDROXY    3. Hair loss  -     THYROID CASCADE PROFILE      Follow-up and Dispositions    · Return if symptoms worsen or fail to improve.        current treatment plan is effective, no change in therapy  reviewed diet, exercise and weight control  reviewed medications and side effects in detail

## 2020-01-16 LAB
25(OH)D3+25(OH)D2 SERPL-MCNC: 50.9 NG/ML (ref 30–100)
ALBUMIN SERPL-MCNC: 4 G/DL (ref 3.6–4.8)
ALBUMIN/GLOB SERPL: 1.5 {RATIO} (ref 1.2–2.2)
ALP SERPL-CCNC: 75 IU/L (ref 39–117)
ALT SERPL-CCNC: 19 IU/L (ref 0–32)
AST SERPL-CCNC: 24 IU/L (ref 0–40)
BASOPHILS # BLD AUTO: 0 X10E3/UL (ref 0–0.2)
BASOPHILS NFR BLD AUTO: 0 %
BILIRUB SERPL-MCNC: 0.3 MG/DL (ref 0–1.2)
BUN SERPL-MCNC: 12 MG/DL (ref 8–27)
BUN/CREAT SERPL: 17 (ref 12–28)
CALCIUM SERPL-MCNC: 9.2 MG/DL (ref 8.7–10.3)
CHLORIDE SERPL-SCNC: 109 MMOL/L (ref 96–106)
CHOLEST SERPL-MCNC: 181 MG/DL (ref 100–199)
CO2 SERPL-SCNC: 27 MMOL/L (ref 20–29)
CREAT SERPL-MCNC: 0.7 MG/DL (ref 0.57–1)
EOSINOPHIL # BLD AUTO: 0.2 X10E3/UL (ref 0–0.4)
EOSINOPHIL NFR BLD AUTO: 3 %
ERYTHROCYTE [DISTWIDTH] IN BLOOD BY AUTOMATED COUNT: 12.7 % (ref 11.7–15.4)
EST. AVERAGE GLUCOSE BLD GHB EST-MCNC: 114 MG/DL
GLOBULIN SER CALC-MCNC: 2.6 G/DL (ref 1.5–4.5)
GLUCOSE SERPL-MCNC: 91 MG/DL (ref 65–99)
HBA1C MFR BLD: 5.6 % (ref 4.8–5.6)
HCT VFR BLD AUTO: 34.4 % (ref 34–46.6)
HDLC SERPL-MCNC: 51 MG/DL
HGB BLD-MCNC: 12 G/DL (ref 11.1–15.9)
IMM GRANULOCYTES # BLD AUTO: 0 X10E3/UL (ref 0–0.1)
IMM GRANULOCYTES NFR BLD AUTO: 0 %
LDLC SERPL CALC-MCNC: 120 MG/DL (ref 0–99)
LYMPHOCYTES # BLD AUTO: 2.1 X10E3/UL (ref 0.7–3.1)
LYMPHOCYTES NFR BLD AUTO: 30 %
MCH RBC QN AUTO: 31.5 PG (ref 26.6–33)
MCHC RBC AUTO-ENTMCNC: 34.9 G/DL (ref 31.5–35.7)
MCV RBC AUTO: 90 FL (ref 79–97)
MONOCYTES # BLD AUTO: 0.4 X10E3/UL (ref 0.1–0.9)
MONOCYTES NFR BLD AUTO: 6 %
NEUTROPHILS # BLD AUTO: 4.3 X10E3/UL (ref 1.4–7)
NEUTROPHILS NFR BLD AUTO: 61 %
PLATELET # BLD AUTO: 282 X10E3/UL (ref 150–450)
POTASSIUM SERPL-SCNC: 4 MMOL/L (ref 3.5–5.2)
PROT SERPL-MCNC: 6.6 G/DL (ref 6–8.5)
RBC # BLD AUTO: 3.81 X10E6/UL (ref 3.77–5.28)
SODIUM SERPL-SCNC: 148 MMOL/L (ref 134–144)
TRIGL SERPL-MCNC: 52 MG/DL (ref 0–149)
TSH SERPL DL<=0.005 MIU/L-ACNC: 0.49 UIU/ML (ref 0.45–4.5)
VLDLC SERPL CALC-MCNC: 10 MG/DL (ref 5–40)
WBC # BLD AUTO: 7 X10E3/UL (ref 3.4–10.8)

## 2020-01-16 NOTE — PROGRESS NOTES
Please mail letter:    1. CBC, kidney, liver, thyroid level is fine. 2. Total cholesterol level is normal but the bad cholesterol level is very mildly elevated. Improved than two years ago. Continue watching your diet, eat healthy, less ann and fatty food, more baked or grilled or broiled food. Exercise 150 minutes/week will be helpful as well. Will recheck level in one year.     3. Vit d level is normal.     4.  Average blood sugar( Hg A1c ) is in normal range

## 2020-04-09 ENCOUNTER — E-VISIT (OUTPATIENT)
Dept: PRIMARY CARE CLINIC | Age: 60
End: 2020-04-09

## 2020-04-09 DIAGNOSIS — R68.89 LOW BODY TEMPERATURE: Primary | ICD-10-CM

## 2020-04-09 NOTE — TELEPHONE ENCOUNTER
I sent the below message to her:       John Hilton you are doing well. I tried to call you but did not leave a message. I looked at the temperature number that you sent me. Yes those numbers looks abnormal.  I looked your temp recorded in our charts where you did have several 96 degrees but never seen 93. Any other symptoms? Any cough, headache, short of breath, urinary symptoms, abdominal pain. Are you feeling sick? I hope you did not check temp right after you ate cold drinks? Could you get a different thermometer and recheck and let me know? Thanks.

## 2020-04-10 ENCOUNTER — TELEPHONE (OUTPATIENT)
Dept: PRIMARY CARE CLINIC | Age: 60
End: 2020-04-10

## 2020-04-10 NOTE — TELEPHONE ENCOUNTER
----- Message from Merline Ambrosia sent at 4/10/2020  8:33 AM EDT -----  Regarding: Dr. David Victoria: [phone]  Caller's first and last name: n/a  Reason for call: Returning the doctors call.   Callback required yes/no and why: yes  Best contact number(s): [phone]  Details to clarify the request: n/a

## 2020-08-05 ENCOUNTER — HOSPITAL ENCOUNTER (OUTPATIENT)
Dept: MAMMOGRAPHY | Age: 60
Discharge: HOME | End: 2020-08-05
Attending: FAMILY MEDICINE
Payer: COMMERCIAL

## 2020-08-05 DIAGNOSIS — Z12.31 VISIT FOR SCREENING MAMMOGRAM: ICD-10-CM

## 2020-08-05 PROCEDURE — 77063 BREAST TOMOSYNTHESIS BI: CPT

## 2021-07-26 ENCOUNTER — TRANSCRIBE ORDER (OUTPATIENT)
Dept: SCHEDULING | Age: 61
End: 2021-07-26

## 2021-07-26 DIAGNOSIS — Z12.31 VISIT FOR SCREENING MAMMOGRAM: Primary | ICD-10-CM

## 2021-08-18 ENCOUNTER — HOSPITAL ENCOUNTER (OUTPATIENT)
Dept: MAMMOGRAPHY | Age: 61
Discharge: HOME | End: 2021-08-18
Attending: FAMILY MEDICINE
Payer: COMMERCIAL

## 2021-08-18 DIAGNOSIS — Z12.31 VISIT FOR SCREENING MAMMOGRAM: ICD-10-CM

## 2021-08-18 PROCEDURE — 77063 BREAST TOMOSYNTHESIS BI: CPT

## 2021-09-07 ENCOUNTER — TELEPHONE (OUTPATIENT)
Dept: PRIMARY CARE CLINIC | Age: 61
End: 2021-09-07

## 2021-09-07 NOTE — TELEPHONE ENCOUNTER
----- Message from Haydee Crawford sent at 9/7/2021 11:52 AM EDT -----  Regarding: Dr. Angelita oPole: [phone]  General Message/Vendor Calls    Caller's first and last name: Pt.       Reason for call: Would like lab order to get blood work done before appointment 10/13. Callback required yes/no and why: Yes, needs lab order. Best contact number(s): [phone]      Details to clarify the request: N/a.       Haydee Crawford

## 2021-09-08 NOTE — TELEPHONE ENCOUNTER
She is not due for physical yet. Her last labs were fine and no follow up needed at this time. Does she have any other medical concern?

## 2021-09-08 NOTE — TELEPHONE ENCOUNTER
Called pt. She has no concerns but thought it was time for her physical. I informed her that her physical isn't due until January 2022 if she'd like to schedule now; she'd prefer to wait. & requests to cancel appt on 10/13/2021.

## 2022-03-18 PROBLEM — R73.09 ELEVATED GLYCOSYLATED HEMOGLOBIN: Status: ACTIVE | Noted: 2017-02-23

## 2022-03-19 PROBLEM — E78.5 MILD HYPERLIPIDEMIA: Status: ACTIVE | Noted: 2017-02-23

## 2022-05-20 ENCOUNTER — TELEPHONE (OUTPATIENT)
Dept: PRIMARY CARE CLINIC | Age: 62
End: 2022-05-20

## 2022-05-20 ENCOUNTER — NURSE TRIAGE (OUTPATIENT)
Dept: OTHER | Facility: CLINIC | Age: 62
End: 2022-05-20

## 2022-05-20 NOTE — TELEPHONE ENCOUNTER
Patient was nurse Triage to me explain to the patient that Dr. Milan Banks no longer works with the practice that she would need to establish care with a new provider. Patient stated that she wanted to think about who she want to see and give the office a call back.

## 2022-05-20 NOTE — TELEPHONE ENCOUNTER
Received call from Shanna Pulido at [facility] with [employer]. Subjective: Caller states \"Lightheadedness for the last month, doesn't happen everyday. \"     Current Symptoms: Lightheadedness    Onset: x1 month intermittently     Associated Symptoms: NA    Pain Severity: Slight headache off and on     Temperature: Denies fever and feeling feverish/chills     What has been tried: Tylenol     LMP: NA Pregnant: NA    Recommended disposition: See in Office Within 2 Weeks    Care advice provided, patient verbalizes understanding; denies any other questions or concerns; instructed to call back for any new or worsening symptoms. Patient/Caller agrees with recommended disposition; writer provided warm transfer to Claudetta Brigham at [facility] for appointment scheduling. Attention Provider: Thank you for allowing me to participate in the care of your patient. The patient was connected to triage in response to information provided to the [facility]. Please do not respond through this encounter as the response is not directed to a shared pool.     Reason for Disposition   Dizziness not present now, but is a chronic symptom (recurrent or ongoing AND lasting > 4 weeks)    Additional Information   Negative: MILD dizziness (e.g., walking normally) AND has NOT been evaluated by physician for this (Exception: dizziness caused by heat exposure, sudden standing, or poor fluid intake)     Happened when going from sitting to standing position    Protocols used: DIZZINESS-ADULT-[place]

## 2022-08-05 ENCOUNTER — TRANSCRIBE ORDER (OUTPATIENT)
Dept: SCHEDULING | Age: 62
End: 2022-08-05

## 2022-08-05 DIAGNOSIS — Z12.31 VISIT FOR SCREENING MAMMOGRAM: Primary | ICD-10-CM

## 2022-11-04 ENCOUNTER — HOSPITAL ENCOUNTER (OUTPATIENT)
Dept: MAMMOGRAPHY | Age: 62
Discharge: HOME | End: 2022-11-04
Attending: INTERNAL MEDICINE
Payer: COMMERCIAL

## 2022-11-04 DIAGNOSIS — Z12.31 VISIT FOR SCREENING MAMMOGRAM: ICD-10-CM

## 2022-11-04 PROCEDURE — 77063 BREAST TOMOSYNTHESIS BI: CPT

## 2023-05-16 RX ORDER — LANOLIN ALCOHOL/MO/W.PET/CERES
CREAM (GRAM) TOPICAL
COMMUNITY

## 2023-05-16 RX ORDER — ACETAMINOPHEN 500 MG
TABLET ORAL EVERY 6 HOURS PRN
COMMUNITY

## 2023-05-16 RX ORDER — LIDOCAINE 4 G/G
1 PATCH TOPICAL PRN
COMMUNITY

## 2023-11-17 ENCOUNTER — HOSPITAL ENCOUNTER (OUTPATIENT)
Age: 63
Discharge: HOME | End: 2023-11-17
Payer: COMMERCIAL

## 2023-11-17 VITALS — WEIGHT: 148 LBS | BODY MASS INDEX: 26.22 KG/M2 | HEIGHT: 63 IN

## 2023-11-17 DIAGNOSIS — Z12.31 SCREENING MAMMOGRAM FOR HIGH-RISK PATIENT: ICD-10-CM

## 2023-11-17 PROCEDURE — 77063 BREAST TOMOSYNTHESIS BI: CPT
